# Patient Record
Sex: MALE | Race: WHITE | Employment: OTHER | URBAN - METROPOLITAN AREA
[De-identification: names, ages, dates, MRNs, and addresses within clinical notes are randomized per-mention and may not be internally consistent; named-entity substitution may affect disease eponyms.]

---

## 2021-11-23 ENCOUNTER — TRANSCRIBE ORDER (OUTPATIENT)
Dept: SCHEDULING | Age: 76
End: 2021-11-23

## 2021-11-23 DIAGNOSIS — R55 SYNCOPE: ICD-10-CM

## 2021-11-23 DIAGNOSIS — R42 DIZZINESS AND GIDDINESS: Primary | ICD-10-CM

## 2021-11-30 ENCOUNTER — HOSPITAL ENCOUNTER (OUTPATIENT)
Dept: CT IMAGING | Age: 76
Discharge: HOME | End: 2021-11-30
Attending: FAMILY MEDICINE
Payer: MEDICARE

## 2021-11-30 DIAGNOSIS — R55 SYNCOPE: ICD-10-CM

## 2021-11-30 DIAGNOSIS — R42 DIZZINESS AND GIDDINESS: ICD-10-CM

## 2021-11-30 PROCEDURE — 70450 CT HEAD/BRAIN W/O DYE: CPT

## 2023-07-20 ENCOUNTER — OFFICE VISIT (OUTPATIENT)
Age: 78
End: 2023-07-20
Payer: MEDICARE

## 2023-07-20 VITALS
DIASTOLIC BLOOD PRESSURE: 78 MMHG | RESPIRATION RATE: 16 BRPM | SYSTOLIC BLOOD PRESSURE: 122 MMHG | OXYGEN SATURATION: 97 % | HEIGHT: 72 IN | HEART RATE: 76 BPM | BODY MASS INDEX: 31.97 KG/M2 | WEIGHT: 236 LBS | TEMPERATURE: 97.8 F

## 2023-07-20 DIAGNOSIS — E53.8 B12 DEFICIENCY: ICD-10-CM

## 2023-07-20 DIAGNOSIS — E78.2 MIXED HYPERLIPIDEMIA: ICD-10-CM

## 2023-07-20 DIAGNOSIS — I65.23 BILATERAL CAROTID ARTERY STENOSIS: Primary | ICD-10-CM

## 2023-07-20 DIAGNOSIS — R41.3 DISTURBANCE OF MEMORY: ICD-10-CM

## 2023-07-20 DIAGNOSIS — I67.89 CEREBRAL MICROVASCULAR DISEASE: ICD-10-CM

## 2023-07-20 DIAGNOSIS — E55.9 VITAMIN D DEFICIENCY: ICD-10-CM

## 2023-07-20 DIAGNOSIS — E03.4 HYPOTHYROIDISM DUE TO ACQUIRED ATROPHY OF THYROID: ICD-10-CM

## 2023-07-20 DIAGNOSIS — I10 ESSENTIAL HYPERTENSION: ICD-10-CM

## 2023-07-20 PROBLEM — E78.5 HYPERLIPIDEMIA: Status: ACTIVE | Noted: 2023-07-20

## 2023-07-20 PROCEDURE — 99204 OFFICE O/P NEW MOD 45 MIN: CPT | Performed by: PSYCHIATRY & NEUROLOGY

## 2023-07-20 PROCEDURE — 3078F DIAST BP <80 MM HG: CPT | Performed by: PSYCHIATRY & NEUROLOGY

## 2023-07-20 PROCEDURE — 3074F SYST BP LT 130 MM HG: CPT | Performed by: PSYCHIATRY & NEUROLOGY

## 2023-07-20 PROCEDURE — 1123F ACP DISCUSS/DSCN MKR DOCD: CPT | Performed by: PSYCHIATRY & NEUROLOGY

## 2023-07-20 PROCEDURE — G8417 CALC BMI ABV UP PARAM F/U: HCPCS | Performed by: PSYCHIATRY & NEUROLOGY

## 2023-07-20 PROCEDURE — G8427 DOCREV CUR MEDS BY ELIG CLIN: HCPCS | Performed by: PSYCHIATRY & NEUROLOGY

## 2023-07-20 PROCEDURE — 1036F TOBACCO NON-USER: CPT | Performed by: PSYCHIATRY & NEUROLOGY

## 2023-07-20 RX ORDER — ASPIRIN 81 MG/1
TABLET, CHEWABLE ORAL
COMMUNITY

## 2023-07-20 RX ORDER — ATENOLOL 25 MG/1
TABLET ORAL
COMMUNITY
Start: 2023-05-24

## 2023-07-20 RX ORDER — ATORVASTATIN CALCIUM 80 MG/1
TABLET, FILM COATED ORAL
COMMUNITY
Start: 2023-05-14

## 2023-07-20 RX ORDER — ACETAMINOPHEN 160 MG
TABLET,DISINTEGRATING ORAL
COMMUNITY

## 2023-07-21 LAB
25(OH)D3 SERPL-MCNC: 42.4 NG/ML (ref 30–100)
FOLATE SERPL-MCNC: 57.5 NG/ML (ref 5–21)
TSH SERPL DL<=0.05 MIU/L-ACNC: 1.1 UIU/ML (ref 0.36–3.74)
VIT B12 SERPL-MCNC: 591 PG/ML (ref 193–986)

## 2023-07-21 NOTE — PROGRESS NOTES
Consult  REFERRED BY:  No primary care provider on file. CHIEF COMPLAINT: Memory loss      Subjective:     Joy Patel is a 65 y.o. right-handed  male we are seeing as a new patient, at the referral of Dr. Ron Barker, for urgent work in basis because of progressive memory loss over the last year that came on gradually and then slowly progressive, associated more with remembering recent things, where he puts things, names of people and what he is told, that is now becoming very worrisome to his wife. He had a brother who had Alzheimer's disease and just  of that. He had no other family history of dementia. He does not have any loss of bowel or bladder control and does not shuffle his gait. He has had no toxin exposure and significant head trauma in the past and denies any major depression or psychiatric problems. He has never had any evaluation for this. He eats fairly well and tries to stay fairly active and is a retired  and has a high school education. Again he had no unusual head trauma in the past.  He has had no complaints of headache, vision changes, weakness or sensory loss, or other focal neurologic deficits.     Past Medical History:   Diagnosis Date    CAD (coronary artery disease)     Dementia ([address])     Essential hypertension     Hx of CABG     Hyperlipidemia       Past Surgical History:   Procedure Laterality Date    LUMBAR LAMINECTOMY Bilateral     REVISION TOTAL HIP ARTHROPLASTY Right     REVISION TOTAL KNEE ARTHROPLASTY Left      Family History   Problem Relation Age of Onset    Heart Disease Father     Heart Disease Brother     Alzheimer's Disease Brother       Social History     Tobacco Use    Smoking status: Former     Types: Cigarettes    Smokeless tobacco: Never   Substance Use Topics    Alcohol use: Not Currently         Current Outpatient Medications:     aspirin 81 MG chewable tablet, , Disp: , Rfl:     atenolol (TENORMIN) 25 MG tablet, , Disp: , Rfl:

## 2023-07-24 ENCOUNTER — PROCEDURE VISIT (OUTPATIENT)
Age: 78
End: 2023-07-24
Payer: MEDICARE

## 2023-07-24 DIAGNOSIS — I67.89 CEREBRAL MICROVASCULAR DISEASE: ICD-10-CM

## 2023-07-24 DIAGNOSIS — I65.23 BILATERAL CAROTID ARTERY STENOSIS: ICD-10-CM

## 2023-07-24 PROCEDURE — 93880 EXTRACRANIAL BILAT STUDY: CPT | Performed by: PSYCHIATRY & NEUROLOGY

## 2023-08-16 ENCOUNTER — HOSPITAL ENCOUNTER (OUTPATIENT)
Facility: HOSPITAL | Age: 78
Discharge: HOME | End: 2023-08-19
Attending: PSYCHIATRY & NEUROLOGY
Payer: MEDICARE

## 2023-08-16 DIAGNOSIS — I67.89 CEREBRAL MICROVASCULAR DISEASE: ICD-10-CM

## 2023-08-16 DIAGNOSIS — R41.3 DISTURBANCE OF MEMORY: ICD-10-CM

## 2023-08-16 DIAGNOSIS — I65.23 BILATERAL CAROTID ARTERY STENOSIS: ICD-10-CM

## 2023-08-16 DIAGNOSIS — E53.8 B12 DEFICIENCY: ICD-10-CM

## 2023-08-16 PROCEDURE — 70553 MRI BRAIN STEM W/O & W/DYE: CPT

## 2023-08-16 PROCEDURE — A9579 GAD-BASE MR CONTRAST NOS,1ML: HCPCS | Performed by: PSYCHIATRY & NEUROLOGY

## 2023-08-16 PROCEDURE — 6360000004 HC RX CONTRAST MEDICATION: Performed by: PSYCHIATRY & NEUROLOGY

## 2023-08-16 RX ADMIN — GADOTERIDOL 20 ML: 279.3 INJECTION, SOLUTION INTRAVENOUS at 11:28

## 2023-08-19 NOTE — PROGRESS NOTES
I called the patient, talked to the wife, let her know the MRI scan showed atrophy.   In the temporal lobes, consistent with his memory loss syndrome, he will get his neuropsych testing she said thank you I told her she could look at the report on my chart

## 2023-10-23 ENCOUNTER — OFFICE VISIT (OUTPATIENT)
Age: 78
End: 2023-10-23
Payer: MEDICARE

## 2023-10-23 VITALS
WEIGHT: 233 LBS | RESPIRATION RATE: 16 BRPM | HEIGHT: 71 IN | BODY MASS INDEX: 32.62 KG/M2 | OXYGEN SATURATION: 98 % | TEMPERATURE: 99.2 F | DIASTOLIC BLOOD PRESSURE: 64 MMHG | SYSTOLIC BLOOD PRESSURE: 118 MMHG | HEART RATE: 63 BPM

## 2023-10-23 DIAGNOSIS — G30.1 LATE ONSET ALZHEIMER'S DISEASE WITHOUT BEHAVIORAL DISTURBANCE (HCC): ICD-10-CM

## 2023-10-23 DIAGNOSIS — I67.89 CEREBRAL MICROVASCULAR DISEASE: Primary | ICD-10-CM

## 2023-10-23 DIAGNOSIS — R41.3 DISTURBANCE OF MEMORY: ICD-10-CM

## 2023-10-23 DIAGNOSIS — F02.80 LATE ONSET ALZHEIMER'S DISEASE WITHOUT BEHAVIORAL DISTURBANCE (HCC): ICD-10-CM

## 2023-10-23 DIAGNOSIS — I65.23 BILATERAL CAROTID ARTERY STENOSIS: ICD-10-CM

## 2023-10-23 PROCEDURE — 1123F ACP DISCUSS/DSCN MKR DOCD: CPT | Performed by: PSYCHIATRY & NEUROLOGY

## 2023-10-23 PROCEDURE — 3074F SYST BP LT 130 MM HG: CPT | Performed by: PSYCHIATRY & NEUROLOGY

## 2023-10-23 PROCEDURE — G8427 DOCREV CUR MEDS BY ELIG CLIN: HCPCS | Performed by: PSYCHIATRY & NEUROLOGY

## 2023-10-23 PROCEDURE — G8417 CALC BMI ABV UP PARAM F/U: HCPCS | Performed by: PSYCHIATRY & NEUROLOGY

## 2023-10-23 PROCEDURE — 99214 OFFICE O/P EST MOD 30 MIN: CPT | Performed by: PSYCHIATRY & NEUROLOGY

## 2023-10-23 PROCEDURE — G8484 FLU IMMUNIZE NO ADMIN: HCPCS | Performed by: PSYCHIATRY & NEUROLOGY

## 2023-10-23 PROCEDURE — 3078F DIAST BP <80 MM HG: CPT | Performed by: PSYCHIATRY & NEUROLOGY

## 2023-10-23 PROCEDURE — 1036F TOBACCO NON-USER: CPT | Performed by: PSYCHIATRY & NEUROLOGY

## 2023-10-23 RX ORDER — GLUCOSAM/CHONDRO/HERB 149/HYAL 750-100 MG
1 TABLET ORAL DAILY
Qty: 30 TABLET | Refills: 3
Start: 2023-10-23

## 2023-10-23 RX ORDER — DONEPEZIL HYDROCHLORIDE 10 MG/1
TABLET, FILM COATED ORAL
Qty: 30 TABLET | Refills: 11 | Status: SHIPPED | OUTPATIENT
Start: 2023-10-23 | End: 2023-10-27 | Stop reason: SDUPTHER

## 2023-10-23 RX ORDER — ZINC GLUCONATE 50 MG
TABLET ORAL
COMMUNITY
Start: 2022-02-01

## 2023-10-23 ASSESSMENT — PATIENT HEALTH QUESTIONNAIRE - PHQ9
SUM OF ALL RESPONSES TO PHQ QUESTIONS 1-9: 0
1. LITTLE INTEREST OR PLEASURE IN DOING THINGS: 0
2. FEELING DOWN, DEPRESSED OR HOPELESS: 0
SUM OF ALL RESPONSES TO PHQ9 QUESTIONS 1 & 2: 0
SUM OF ALL RESPONSES TO PHQ QUESTIONS 1-9: 0

## 2023-10-24 NOTE — PROGRESS NOTES
memory test showed he had mild dementia or MCI he may be a candidate for that medication, and possibly for a amyloid scan of the brain to see if his dementia really is more amyloid and Alzheimer's type and not some other dementia like frontotemporal dementia or multisystem atrophy etc.  He is on 5 mg of Aricept also and this may be helping him to, we will advance that to 10 mg and the risk and benefits all explained the patient and his wife today, we counseled him on the other medication Namenda and the new monoclonal antibody against amyloid that is available now and the one that may be available next year that may even be better. We explained the risk of medication and its 10% incidence of inflammatory lesions in the brain, and 1% incidence of significant hemorrhagic lesions in the brain, and 3 deaths so far from the monoclonal antibodies. They are not too anxious for the medications now because they realize this expensive, it is time-consuming, has to be given every 2 weeks, requires multiple testing. He has not seemed to deteriorate over the last 6 months anyway. We encouraged him to stay mentally and physically active try to exercise mildly every day, eat a Mediterranean type diet, he said he is try to do all that perhaps is why he is doing better. He is also taking a multivitamin with vitamin D every day if he recommended also. His carotid Doppler studies last visit were also negative for any significant stenosis or surgical lesions. 36 minutes spent with the wife and the patient going over all this in detail.   Signed By: Jayla Rubio MD     October 23, 2023       CC: Mounika Su MD  FAX: [phone]

## 2023-10-27 ENCOUNTER — CLINICAL DOCUMENTATION (OUTPATIENT)
Age: 78
End: 2023-10-27

## 2023-10-27 DIAGNOSIS — F02.80 LATE ONSET ALZHEIMER'S DISEASE WITHOUT BEHAVIORAL DISTURBANCE (HCC): ICD-10-CM

## 2023-10-27 DIAGNOSIS — I67.89 CEREBRAL MICROVASCULAR DISEASE: ICD-10-CM

## 2023-10-27 DIAGNOSIS — R41.3 DISTURBANCE OF MEMORY: ICD-10-CM

## 2023-10-27 DIAGNOSIS — G30.1 LATE ONSET ALZHEIMER'S DISEASE WITHOUT BEHAVIORAL DISTURBANCE (HCC): ICD-10-CM

## 2023-10-27 DIAGNOSIS — I65.23 BILATERAL CAROTID ARTERY STENOSIS: ICD-10-CM

## 2023-10-27 RX ORDER — DONEPEZIL HYDROCHLORIDE 10 MG/1
TABLET, FILM COATED ORAL
Qty: 30 TABLET | Refills: 11 | Status: SHIPPED | OUTPATIENT
Start: 2023-10-27

## 2023-10-31 ENCOUNTER — OFFICE VISIT (OUTPATIENT)
Age: 78
End: 2023-10-31
Payer: MEDICARE

## 2023-10-31 DIAGNOSIS — F02.A0 MILD LATE ONSET ALZHEIMER'S DEMENTIA WITHOUT BEHAVIORAL DISTURBANCE, PSYCHOTIC DISTURBANCE, MOOD DISTURBANCE, OR ANXIETY (HCC): Primary | ICD-10-CM

## 2023-10-31 DIAGNOSIS — G30.1 MILD LATE ONSET ALZHEIMER'S DEMENTIA WITHOUT BEHAVIORAL DISTURBANCE, PSYCHOTIC DISTURBANCE, MOOD DISTURBANCE, OR ANXIETY (HCC): Primary | ICD-10-CM

## 2023-10-31 PROCEDURE — 1036F TOBACCO NON-USER: CPT | Performed by: CLINICAL NEUROPSYCHOLOGIST

## 2023-10-31 PROCEDURE — 90785 PSYTX COMPLEX INTERACTIVE: CPT | Performed by: CLINICAL NEUROPSYCHOLOGIST

## 2023-10-31 PROCEDURE — 90791 PSYCH DIAGNOSTIC EVALUATION: CPT | Performed by: CLINICAL NEUROPSYCHOLOGIST

## 2023-10-31 PROCEDURE — 1123F ACP DISCUSS/DSCN MKR DOCD: CPT | Performed by: CLINICAL NEUROPSYCHOLOGIST

## 2023-10-31 NOTE — PROGRESS NOTES
follow-up with referring provider for ongoing management    TIME DOCUMENTATION:  Clinical Interview: 8821 - 8943 = 20 minutes    Visit was complicated by Patient expressed desire to have others present during visit. Provider spent 10 minutes providing interactive complexity services due to need to obtain or communicate information to those involved in patient's care. The session included the use of the following adaptations in order to overcome the difficulties. Others were included in order to obtain collateral history on individual with reported short-term episodic memory deficits and likely dementia .       [id number]  [id number]

## 2023-11-06 ENCOUNTER — TELEPHONE (OUTPATIENT)
Age: 78
End: 2023-11-06

## 2023-11-06 NOTE — TELEPHONE ENCOUNTER
Contacted [employer] no PA is needed for [id number], [id number], [id number], [id number], [id number].     Transaction ID: [id number]

## 2023-11-16 ENCOUNTER — PROCEDURE VISIT (OUTPATIENT)
Age: 78
End: 2023-11-16
Payer: MEDICARE

## 2023-11-16 DIAGNOSIS — F03.A0 MILD DEMENTIA WITHOUT BEHAVIORAL DISTURBANCE, PSYCHOTIC DISTURBANCE, MOOD DISTURBANCE, OR ANXIETY, UNSPECIFIED DEMENTIA TYPE (HCC): Primary | ICD-10-CM

## 2023-11-16 PROCEDURE — 96136 PSYCL/NRPSYC TST PHY/QHP 1ST: CPT | Performed by: CLINICAL NEUROPSYCHOLOGIST

## 2023-11-16 PROCEDURE — 96138 PSYCL/NRPSYC TECH 1ST: CPT | Performed by: CLINICAL NEUROPSYCHOLOGIST

## 2023-11-16 PROCEDURE — 96139 PSYCL/NRPSYC TST TECH EA: CPT | Performed by: CLINICAL NEUROPSYCHOLOGIST

## 2023-12-01 ENCOUNTER — CLINICAL DOCUMENTATION (OUTPATIENT)
Age: 78
End: 2023-12-01

## 2023-12-01 PROBLEM — F03.A0 MILD DEMENTIA WITHOUT BEHAVIORAL DISTURBANCE, PSYCHOTIC DISTURBANCE, MOOD DISTURBANCE, OR ANXIETY (HCC): Status: ACTIVE | Noted: 2023-12-01

## 2023-12-01 NOTE — PROGRESS NOTES
average  Semantic Fluency: Average  Learning   List learning: Low average  Story learning: Low average  Basic figure learning: Exceptionally low  Progressively complex figure learning: Below average  Memory:  List recall: Below average  Story recall: Below average  Basic figure recall: Exceptionally low  Progressively complex figure recall: Exceptionally low  Recognition:  List recognition: Low average  Story recognition: Within normal limits  Basic figure recognition: Within normal limits  Progressively complex figure recognition: Low average  Functional:   Pillbox organization: Failed  Bill pay: Exceptionally low  Practical judgment: Low average  Psychiatric/Sleep:   Depression: Within normal limits  Anxiety: Within normal limits  Daytime sleepiness: Within normal limits  Sleep quality: Within normal limits    TIME:  Clinical Interview: 0059 - 4882 = 20 minutes  Testing and scoring by provider: 16 minutes  Testing by technician: 6872 - 1018 = 135 minutes  Scoring by technician: 23 minutes    BILLING:  76955 x 1 Unit  96136 x 1 Unit  96138 x 1 Unit  96139 x 4 Units

## 2023-12-01 NOTE — PROGRESS NOTES
Patient's neuropsych testing suggest a mild dementia but more typical of a subcortical dementia rather than Alzheimer's type, so he may have more of a frontal temporal dementia or vascular dementia we will need to evaluate that at his return.

## 2023-12-04 ENCOUNTER — OFFICE VISIT (OUTPATIENT)
Age: 78
End: 2023-12-04
Payer: MEDICARE

## 2023-12-04 DIAGNOSIS — F03.A0 MILD DEMENTIA WITHOUT BEHAVIORAL DISTURBANCE, PSYCHOTIC DISTURBANCE, MOOD DISTURBANCE, OR ANXIETY, UNSPECIFIED DEMENTIA TYPE (HCC): Primary | ICD-10-CM

## 2023-12-04 PROCEDURE — 96133 NRPSYC TST EVAL PHYS/QHP EA: CPT | Performed by: CLINICAL NEUROPSYCHOLOGIST

## 2023-12-04 PROCEDURE — 96132 NRPSYC TST EVAL PHYS/QHP 1ST: CPT | Performed by: CLINICAL NEUROPSYCHOLOGIST

## 2023-12-04 NOTE — PROGRESS NOTES
test results and clinical data, clinical decision-making, treatment planning and report, and interactive feedback to the patient, family member(s) or caregiver(s), when performed.

## 2024-01-25 DIAGNOSIS — I65.23 BILATERAL CAROTID ARTERY STENOSIS: ICD-10-CM

## 2024-01-25 DIAGNOSIS — F02.80 LATE ONSET ALZHEIMER'S DISEASE WITHOUT BEHAVIORAL DISTURBANCE (HCC): ICD-10-CM

## 2024-01-25 DIAGNOSIS — G30.1 LATE ONSET ALZHEIMER'S DISEASE WITHOUT BEHAVIORAL DISTURBANCE (HCC): ICD-10-CM

## 2024-01-25 DIAGNOSIS — R41.3 DISTURBANCE OF MEMORY: ICD-10-CM

## 2024-01-25 DIAGNOSIS — I67.89 CEREBRAL MICROVASCULAR DISEASE: ICD-10-CM

## 2024-01-25 RX ORDER — DONEPEZIL HYDROCHLORIDE 10 MG/1
TABLET, FILM COATED ORAL
Qty: 30 TABLET | Refills: 11 | Status: SHIPPED | OUTPATIENT
Start: 2024-01-25

## 2024-01-25 NOTE — TELEPHONE ENCOUNTER
Received fax from [facility] pharmacy asking to send pt's rx for donepezil.     Last office visit 12/04/2023  Next office visit 04/23/2024  Last med refill 10/27/2023

## 2024-04-23 ENCOUNTER — OFFICE VISIT (OUTPATIENT)
Age: 79
End: 2024-04-23
Payer: MEDICARE

## 2024-04-23 VITALS
HEART RATE: 61 BPM | BODY MASS INDEX: 33.1 KG/M2 | DIASTOLIC BLOOD PRESSURE: 74 MMHG | OXYGEN SATURATION: 96 % | RESPIRATION RATE: 17 BRPM | SYSTOLIC BLOOD PRESSURE: 136 MMHG | WEIGHT: 236.4 LBS | HEIGHT: 71 IN | TEMPERATURE: 97.7 F

## 2024-04-23 DIAGNOSIS — F02.80 FRONTOTEMPORAL DEMENTIA (HCC): ICD-10-CM

## 2024-04-23 DIAGNOSIS — G31.09 FRONTOTEMPORAL DEMENTIA (HCC): ICD-10-CM

## 2024-04-23 DIAGNOSIS — M96.1 LUMBAR POST-LAMINECTOMY SYNDROME: ICD-10-CM

## 2024-04-23 DIAGNOSIS — I65.23 BILATERAL CAROTID ARTERY STENOSIS: Primary | ICD-10-CM

## 2024-04-23 DIAGNOSIS — I67.89 CEREBRAL MICROVASCULAR DISEASE: ICD-10-CM

## 2024-04-23 DIAGNOSIS — R41.3 DISTURBANCE OF MEMORY: ICD-10-CM

## 2024-04-23 DIAGNOSIS — F01.50 VASCULAR DEMENTIA WITHOUT BEHAVIORAL DISTURBANCE, PSYCHOTIC DISTURBANCE, MOOD DISTURBANCE, OR ANXIETY, UNSPECIFIED DEMENTIA SEVERITY (HCC): ICD-10-CM

## 2024-04-23 PROCEDURE — 3078F DIAST BP <80 MM HG: CPT | Performed by: PSYCHIATRY & NEUROLOGY

## 2024-04-23 PROCEDURE — G8427 DOCREV CUR MEDS BY ELIG CLIN: HCPCS | Performed by: PSYCHIATRY & NEUROLOGY

## 2024-04-23 PROCEDURE — 99214 OFFICE O/P EST MOD 30 MIN: CPT | Performed by: PSYCHIATRY & NEUROLOGY

## 2024-04-23 PROCEDURE — 1123F ACP DISCUSS/DSCN MKR DOCD: CPT | Performed by: PSYCHIATRY & NEUROLOGY

## 2024-04-23 PROCEDURE — 3075F SYST BP GE 130 - 139MM HG: CPT | Performed by: PSYCHIATRY & NEUROLOGY

## 2024-04-23 PROCEDURE — 1036F TOBACCO NON-USER: CPT | Performed by: PSYCHIATRY & NEUROLOGY

## 2024-04-23 PROCEDURE — G8417 CALC BMI ABV UP PARAM F/U: HCPCS | Performed by: PSYCHIATRY & NEUROLOGY

## 2024-04-23 RX ORDER — MEMANTINE HYDROCHLORIDE 5 MG/1
5 TABLET ORAL 2 TIMES DAILY
Qty: 180 TABLET | Refills: 3 | Status: SHIPPED | OUTPATIENT
Start: 2024-04-23

## 2024-04-23 ASSESSMENT — PATIENT HEALTH QUESTIONNAIRE - PHQ9
SUM OF ALL RESPONSES TO PHQ9 QUESTIONS 1 & 2: 0
1. LITTLE INTEREST OR PLEASURE IN DOING THINGS: NOT AT ALL
SUM OF ALL RESPONSES TO PHQ QUESTIONS 1-9: 0
2. FEELING DOWN, DEPRESSED OR HOPELESS: NOT AT ALL
SUM OF ALL RESPONSES TO PHQ QUESTIONS 1-9: 0

## 2024-04-23 NOTE — PROGRESS NOTES
Consult  REFERRED BY:  Jarrod Wood MD    CHIEF COMPLAINT: Patient feels his memory loss is not progressing on Aricept 10 mg a day, and to go over his neuropsych testing.      Subjective:     Jarrod Juarez is a 78 y.o.right-handed  male we are seeing at the referral of Dr. Wood, for urgent work in basis because of need to go over patient's neuropsych testing that he had done in December, they suggest that he does have a mild dementia, but thought to be more typical of frontotemporal dementia with subcortical dementia, not typical of Alzheimer's type dementia, and for that reason he is not really at candidate for the new monoclonal antibodies against amyloid and the patient and his wife both feel that they do not want that medication because of the possibility of the swelling and hemorrhage and they are very adamant they do not want it.  He is willing to try Namenda which we added on next, he is taking 10 mg of Aricept and will add on 5 mg twice a day of Namenda to help slow his memory down.  His MRI scan showed prominent atrophy and frontal temporal atrophy possibly consistent with frontotemporal dementia.  His carotid Doppler study showed no significant carotid stenosis.  Previously patient seen for progressive memory loss over the last year that came on gradually and then slowly progressed,, associated more with remembering recent things, where he puts things, names of people and what he is told, that is now becoming very worrisome to his wife.  He has had several neuropsych testing done, showing mild cognitive impairment and then normal, in the past, at [facility], and by Dr. Gutierrez, and his memory test is as above.  He was able to remember 2 of 3 words, do serial sevens, spell world backward and draw a clock showing the time 10:50 correctly.  He had a brother who had Alzheimer's disease and just  of that.  He had no other family history of dementia.  He does not have any loss of bowel or bladder control

## 2024-09-04 ENCOUNTER — OFFICE VISIT (OUTPATIENT)
Age: 79
End: 2024-09-04

## 2024-09-04 DIAGNOSIS — G30.1 MILD LATE ONSET ALZHEIMER'S DEMENTIA WITHOUT BEHAVIORAL DISTURBANCE, PSYCHOTIC DISTURBANCE, MOOD DISTURBANCE, OR ANXIETY (HCC): Primary | ICD-10-CM

## 2024-09-04 DIAGNOSIS — F02.A0 MILD LATE ONSET ALZHEIMER'S DEMENTIA WITHOUT BEHAVIORAL DISTURBANCE, PSYCHOTIC DISTURBANCE, MOOD DISTURBANCE, OR ANXIETY (HCC): Primary | ICD-10-CM

## 2024-09-04 NOTE — PROGRESS NOTES
Intake Note      Patient Name: Jarrod Juarez  YOB: 1945    Age: 78 y.o.  Date of Intake: 9/4/2024   Education: 12 Ethnicity White   Gender: Male Referring Provider: Dr. Graham     REASON FOR REFERRAL AND EVALUATION PROCEDURES:  Jarrod Juarez  was referred for evaluation by his Neurologist to assist in differential diagnosis and individualized treatment planning. he understood the rationale and procedures for evaluation, as well as the limits to confidentiality, and agreed to participate. he consented to have this report made available to his  treating providers through his  electronic medical records.   History Sources: Patient, Spouse, and Medical Record    HISTORY OF PRESENT ILLNESS:  The patient is a 78-year-old male with pertinent medical history noted for disturbance of memory, cerebral microvascular disease, B12 deficiency, hypothyroidism, vitamin D deficiency, hyperlipidemia, essential hypertension, late onset Alzheimer's disease without behavioral disturbance, mild dementia without behavioral disturbance, vascular dementia without behavioral disturbance, and frontotemporal dementia.  He presented for serial neuropsychological evaluation following workup in our office in October/November of last year.  From my previous report:    The patient presented for clinical interview accompanied by his wife.  While he displayed fair insight and provided details related to his history, his wife assisted with providing additional pertinent information.  According to the patient and his wife, for a little over the past year, the patient has experienced insidious onset of gradually progressive declines in his cognitive functioning.  They described the cognitive changes to include temporally-graded short-term episodic memory loss such as forgetting recent events/conversations and repeating himself.  They largely denied concerns relating to other domains of cognitive functioning     Pertaining to the patient's

## 2024-11-11 ENCOUNTER — PROCEDURE VISIT (OUTPATIENT)
Age: 79
End: 2024-11-11
Payer: MEDICARE

## 2024-11-11 DIAGNOSIS — F03.90 MAJOR NEUROCOGNITIVE DISORDER (HCC): Primary | ICD-10-CM

## 2024-11-11 PROCEDURE — 96139 PSYCL/NRPSYC TST TECH EA: CPT | Performed by: CLINICAL NEUROPSYCHOLOGIST

## 2024-11-11 PROCEDURE — 96138 PSYCL/NRPSYC TECH 1ST: CPT | Performed by: CLINICAL NEUROPSYCHOLOGIST

## 2024-11-11 PROCEDURE — 96133 NRPSYC TST EVAL PHYS/QHP EA: CPT | Performed by: CLINICAL NEUROPSYCHOLOGIST

## 2024-11-11 PROCEDURE — 96132 NRPSYC TST EVAL PHYS/QHP 1ST: CPT | Performed by: CLINICAL NEUROPSYCHOLOGIST

## 2024-11-19 ENCOUNTER — HOSPITAL ENCOUNTER (OUTPATIENT)
Facility: HOSPITAL | Age: 79
Discharge: HOME | End: 2024-11-22
Payer: MEDICARE

## 2024-11-19 ENCOUNTER — TRANSCRIBE ORDERS (OUTPATIENT)
Facility: HOSPITAL | Age: 79
End: 2024-11-19

## 2024-11-19 DIAGNOSIS — G89.29 CHRONIC LOW BACK PAIN, UNSPECIFIED BACK PAIN LATERALITY, UNSPECIFIED WHETHER SCIATICA PRESENT: Primary | ICD-10-CM

## 2024-11-19 DIAGNOSIS — M54.50 CHRONIC LOW BACK PAIN, UNSPECIFIED BACK PAIN LATERALITY, UNSPECIFIED WHETHER SCIATICA PRESENT: ICD-10-CM

## 2024-11-19 DIAGNOSIS — G89.29 CHRONIC LOW BACK PAIN, UNSPECIFIED BACK PAIN LATERALITY, UNSPECIFIED WHETHER SCIATICA PRESENT: ICD-10-CM

## 2024-11-19 DIAGNOSIS — M54.50 CHRONIC LOW BACK PAIN, UNSPECIFIED BACK PAIN LATERALITY, UNSPECIFIED WHETHER SCIATICA PRESENT: Primary | ICD-10-CM

## 2024-11-19 PROCEDURE — 72110 X-RAY EXAM L-2 SPINE 4/>VWS: CPT

## 2024-11-26 ENCOUNTER — TELEPHONE (OUTPATIENT)
Age: 79
End: 2024-11-26

## 2024-11-26 NOTE — PROGRESS NOTES
Neuropsychological Evaluation Report      Patient Name: Jarrod Juarez  YOB: 1945    Age: 78 y.o.  Date of Intake: 2024   Education: 12 Date of Testin2024   Gender: Male Ethnicity: White     Referring Provider: Dr. Graham     REASON FOR REFERRAL AND EVALUATION PROCEDURES:  Jarrod Juarez  was referred for neuropsychological evaluation by his Neurologist to obtain a quantitative assessment of his current level of neurocognitive functioning, assist in differential diagnosis, and aid in individualized treatment planning. The patient understood the rationale and procedures for evaluation, as well as the limits to confidentiality, and they agreed to participate. The patient consented to have this report made available to his  treating providers through his  electronic medical records. This evaluation was completed with the patient by Ger Anaya PsyD with the exception of testing by technician, which was completed by NORM Bowman under the supervision of Dr. Anaya.  History Sources: Patient, Spouse, Medical Record, and Test Data    SUMMARY AND IMPRESSION:  The following section is a summary of the patient's pertinent test results and impressions. A more thorough review of the patient's background and test scores can be found below.    The patient's neuropsychological evaluation was notable for moderate to profound impairment (2-3 standard deviations below the mean) on measures of executive functioning and visuospatial perception/construction.  Learning was generally in the range of mild impairment (~1.5 standard deviations below the mean) while recall was in the range of moderate to profound impairment.  Recognition/discrimination for verbal list information was compromised but other recognition/discrimination performances were within normal limits.  Mental processing speed was moderately impaired.  Working memory was variable but was impacted by executive dysfunction (i.e.,

## 2024-11-27 NOTE — TELEPHONE ENCOUNTER
Returned call to the patient's wife. Informed that the provider is unavailable this evening and unable to see the patient at 4 PM. She states they are unable to come in today at 9:30 AM and asked that the appt be rescheduled to a different date. Appt rescheduled.

## 2024-11-27 NOTE — TELEPHONE ENCOUNTER
Patient wife, Nickie called to say they wanted to keep their appt today at 4PM. They are unable to do 9:30AM today.     During the conversation, she walked away from the phone with no warning, or asking to hold on. I said, \"hello?\" twice with no response, so I hung up the phone. Thank you.

## 2024-12-04 ENCOUNTER — OFFICE VISIT (OUTPATIENT)
Age: 79
End: 2024-12-04
Payer: MEDICARE

## 2024-12-04 ENCOUNTER — CLINICAL DOCUMENTATION (OUTPATIENT)
Age: 79
End: 2024-12-04

## 2024-12-04 DIAGNOSIS — F03.90 MAJOR NEUROCOGNITIVE DISORDER (HCC): Primary | ICD-10-CM

## 2024-12-04 PROCEDURE — 96132 NRPSYC TST EVAL PHYS/QHP 1ST: CPT | Performed by: CLINICAL NEUROPSYCHOLOGIST

## 2024-12-04 NOTE — PROGRESS NOTES
Chief complaint: Patient presented for review of previously completed neuropsychological evaluation and discussion of impressions/recommendations.    Prior to seeing the patient for today's visit, I reviewed pertinent records, including the previously completed report, the records in Epic, and any updated visits from other providers since the patient's last visit.    I provided feedback services related to the previously completed report. Attendees included: Patient and Spouse. Education was provided regarding my diagnostic impressions, and we discussed treatment plan/options. Attendees were provided with the opportunity to ask questions, which were answered to the best of my ability.    We discussed, in detail, the following:  Reviewed findings from evaluation including test results, diagnosis, and suspected contributing factors  Discussed recommendations outlined in report  Answered questions to the best of my ability    The patient needs to:   Follow up with referring provider for ongoing management, Complete driving evaluation, Use practical strategies to compensate for cognitive weaknesses (See handout), Emphasize modifiable risk and protective factors for cognitive functioning (e.g., exercise, diet, cognitive stimulation; see handout), and Access community resources we discussed for additional support (See handout)    The patient had the following reactions to recommendations: Patient and wife reported understanding results and recommendations.  Wife stated the patient had difficulty during their road trip to [place].  Driving was more challenging for him.  She also reported he seems more confused since they returned.    ASSESSMENT:  Major neurocognitive disorder due to underlying disease    TIME SPENT PROVIDING SERVICES:   31 minutes     BILLING:  96132 x 1 Units    *Code 21776 Neuropsychological testing evaluation services include: Integration of patient data, interpretation of standardized test results

## 2024-12-04 NOTE — PROGRESS NOTES
Patient's neuropsych testing suggest moderate impairment and dementia possibly the frontal temporal type and may be of Parkinson's disease because of some tremor, and neurologic follow-up recommended but he does not appear to be a candidate for monoclonal antibodies against amyloid because of his moderate impairment and atypical dementia for Alzheimer's

## 2024-12-17 ENCOUNTER — OFFICE VISIT (OUTPATIENT)
Age: 79
End: 2024-12-17
Payer: MEDICARE

## 2024-12-17 VITALS
OXYGEN SATURATION: 97 % | DIASTOLIC BLOOD PRESSURE: 70 MMHG | RESPIRATION RATE: 19 BRPM | SYSTOLIC BLOOD PRESSURE: 132 MMHG | BODY MASS INDEX: 25.09 KG/M2 | TEMPERATURE: 98.1 F | HEART RATE: 62 BPM | WEIGHT: 179.2 LBS | HEIGHT: 71 IN

## 2024-12-17 DIAGNOSIS — G20.A2 PARKINSON'S DISEASE WITHOUT DYSKINESIA, WITH FLUCTUATING MANIFESTATIONS (HCC): Primary | ICD-10-CM

## 2024-12-17 DIAGNOSIS — I65.23 BILATERAL CAROTID ARTERY STENOSIS: ICD-10-CM

## 2024-12-17 DIAGNOSIS — I67.89 CEREBRAL MICROVASCULAR DISEASE: ICD-10-CM

## 2024-12-17 DIAGNOSIS — F02.80 LATE ONSET ALZHEIMER'S DISEASE WITHOUT BEHAVIORAL DISTURBANCE (HCC): ICD-10-CM

## 2024-12-17 DIAGNOSIS — G30.1 LATE ONSET ALZHEIMER'S DISEASE WITHOUT BEHAVIORAL DISTURBANCE (HCC): ICD-10-CM

## 2024-12-17 DIAGNOSIS — R41.3 DISTURBANCE OF MEMORY: ICD-10-CM

## 2024-12-17 DIAGNOSIS — G25.0 BENIGN ESSENTIAL TREMOR SYNDROME: ICD-10-CM

## 2024-12-17 PROCEDURE — 99214 OFFICE O/P EST MOD 30 MIN: CPT | Performed by: PSYCHIATRY & NEUROLOGY

## 2024-12-17 PROCEDURE — G8427 DOCREV CUR MEDS BY ELIG CLIN: HCPCS | Performed by: PSYCHIATRY & NEUROLOGY

## 2024-12-17 PROCEDURE — 1159F MED LIST DOCD IN RCRD: CPT | Performed by: PSYCHIATRY & NEUROLOGY

## 2024-12-17 PROCEDURE — 3078F DIAST BP <80 MM HG: CPT | Performed by: PSYCHIATRY & NEUROLOGY

## 2024-12-17 PROCEDURE — 1036F TOBACCO NON-USER: CPT | Performed by: PSYCHIATRY & NEUROLOGY

## 2024-12-17 PROCEDURE — G8484 FLU IMMUNIZE NO ADMIN: HCPCS | Performed by: PSYCHIATRY & NEUROLOGY

## 2024-12-17 PROCEDURE — G8420 CALC BMI NORM PARAMETERS: HCPCS | Performed by: PSYCHIATRY & NEUROLOGY

## 2024-12-17 PROCEDURE — 1123F ACP DISCUSS/DSCN MKR DOCD: CPT | Performed by: PSYCHIATRY & NEUROLOGY

## 2024-12-17 PROCEDURE — 3075F SYST BP GE 130 - 139MM HG: CPT | Performed by: PSYCHIATRY & NEUROLOGY

## 2024-12-17 PROCEDURE — 1160F RVW MEDS BY RX/DR IN RCRD: CPT | Performed by: PSYCHIATRY & NEUROLOGY

## 2024-12-17 PROCEDURE — 1125F AMNT PAIN NOTED PAIN PRSNT: CPT | Performed by: PSYCHIATRY & NEUROLOGY

## 2024-12-17 RX ORDER — DONEPEZIL HYDROCHLORIDE 10 MG/1
10 TABLET, FILM COATED ORAL
Qty: 90 TABLET | Refills: 4 | Status: SHIPPED | OUTPATIENT
Start: 2024-12-17

## 2024-12-17 RX ORDER — MEMANTINE HYDROCHLORIDE 10 MG/1
10 TABLET ORAL 2 TIMES DAILY
Qty: 180 TABLET | Refills: 5 | Status: SHIPPED | OUTPATIENT
Start: 2024-12-17

## 2024-12-17 RX ORDER — CARBIDOPA AND LEVODOPA 25; 100 MG/1; MG/1
1 TABLET ORAL 3 TIMES DAILY
Qty: 90 TABLET | Refills: 11 | Status: SHIPPED | OUTPATIENT
Start: 2024-12-17

## 2024-12-17 ASSESSMENT — PATIENT HEALTH QUESTIONNAIRE - PHQ9
SUM OF ALL RESPONSES TO PHQ QUESTIONS 1-9: 0
1. LITTLE INTEREST OR PLEASURE IN DOING THINGS: NOT AT ALL
SUM OF ALL RESPONSES TO PHQ QUESTIONS 1-9: 0
SUM OF ALL RESPONSES TO PHQ9 QUESTIONS 1 & 2: 0
2. FEELING DOWN, DEPRESSED OR HOPELESS: NOT AT ALL

## 2024-12-18 NOTE — PROGRESS NOTES
Consult  REFERRED BY:  Jarrod Wood MD    CHIEF COMPLAINT: Patient seen for new problem of tremors and to go over recent neuropsych testing which suggest a moderate dementia, probably more the frontal temporal type and to examine the patient for possible Parkinson's disease because of increasing tremors in his hands for the last several months.  Patient feels his memory loss is not progressing on Aricept 10 mg a day, and Namenda 5 mg twice a day and wants to go over his neuropsych testing.      Subjective:     Jarrod Juarez is a 79 y.o.right-handed  male we are seeing at the referral of Dr. Wood, for urgent work in basis because of need to go over patient's neuropsych testing that he had done in December 2024 and, Patient seen for new problem of tremors and to go over recent neuropsych testing which suggest a moderate dementia, probably more the frontal temporal type and to examine the patient for possible Parkinson's disease because of increasing tremors in his hands for the last several months.  Patient feels his memory loss is not progressing on Aricept 10 mg a day, and Namenda 5 mg twice a day and wants to go over his neuropsych testing.  The neuropsych testing did suggest a moderate dementia, more the frontal temporal type and in view of his increasing tremor that he says is more of a static and intention type the diagnosis of possible Parkinson's disease was raised, and the patient does appear to have some cogwheeling and rigidity and maybe even rest tremor in his hands, right side slightly worse than the left and slight decreased arm swing on the right side.  It does appear he does have Parkinson's disease which is another primary degenerative progressive brain disease, and for that we will try to increase his Namenda because Namenda helps the tremor and may help the Parkinson's disease and also helps his memory which she said has been stable on his current medications, but he will be advanced

## 2025-03-17 ENCOUNTER — OFFICE VISIT (OUTPATIENT)
Age: 80
End: 2025-03-17
Payer: MEDICARE

## 2025-03-17 VITALS
WEIGHT: 234 LBS | HEIGHT: 72 IN | BODY MASS INDEX: 31.69 KG/M2 | HEART RATE: 62 BPM | DIASTOLIC BLOOD PRESSURE: 68 MMHG | TEMPERATURE: 97.8 F | OXYGEN SATURATION: 98 % | SYSTOLIC BLOOD PRESSURE: 124 MMHG | RESPIRATION RATE: 18 BRPM

## 2025-03-17 DIAGNOSIS — G30.1 LATE ONSET ALZHEIMER'S DISEASE WITHOUT BEHAVIORAL DISTURBANCE (HCC): Primary | ICD-10-CM

## 2025-03-17 DIAGNOSIS — F02.80 LATE ONSET ALZHEIMER'S DISEASE WITHOUT BEHAVIORAL DISTURBANCE (HCC): Primary | ICD-10-CM

## 2025-03-17 DIAGNOSIS — G20.A2 PARKINSON'S DISEASE WITHOUT DYSKINESIA, WITH FLUCTUATING MANIFESTATIONS (HCC): ICD-10-CM

## 2025-03-17 PROCEDURE — 3074F SYST BP LT 130 MM HG: CPT

## 2025-03-17 PROCEDURE — 1036F TOBACCO NON-USER: CPT

## 2025-03-17 PROCEDURE — 3078F DIAST BP <80 MM HG: CPT

## 2025-03-17 PROCEDURE — 1159F MED LIST DOCD IN RCRD: CPT

## 2025-03-17 PROCEDURE — G8417 CALC BMI ABV UP PARAM F/U: HCPCS

## 2025-03-17 PROCEDURE — 99215 OFFICE O/P EST HI 40 MIN: CPT

## 2025-03-17 PROCEDURE — G8427 DOCREV CUR MEDS BY ELIG CLIN: HCPCS

## 2025-03-17 PROCEDURE — 1123F ACP DISCUSS/DSCN MKR DOCD: CPT

## 2025-03-17 PROCEDURE — 1126F AMNT PAIN NOTED NONE PRSNT: CPT

## 2025-03-17 PROCEDURE — 1160F RVW MEDS BY RX/DR IN RCRD: CPT

## 2025-03-17 ASSESSMENT — ENCOUNTER SYMPTOMS
GASTROINTESTINAL NEGATIVE: 1
RESPIRATORY NEGATIVE: 1
EYES NEGATIVE: 1
ALLERGIC/IMMUNOLOGIC NEGATIVE: 1

## 2025-03-17 ASSESSMENT — PATIENT HEALTH QUESTIONNAIRE - PHQ9
SUM OF ALL RESPONSES TO PHQ QUESTIONS 1-9: 0
1. LITTLE INTEREST OR PLEASURE IN DOING THINGS: NOT AT ALL
2. FEELING DOWN, DEPRESSED OR HOPELESS: NOT AT ALL
SUM OF ALL RESPONSES TO PHQ QUESTIONS 1-9: 0

## 2025-03-17 NOTE — PATIENT INSTRUCTIONS
As per our discussion,    As for your memory, we will not make any changes on your medication at this time.  Will continue donepezil 10 mg daily and memantine 10 mg twice daily.    I encouraged you to engage in approximately 2.5 hours of physician approved physical activity on a weekly basis.    To maintain a healthy diet. Also was informed of the Mediterranean diet, which has been associated with reduced risk for neurodegenerative conditions as well as heart disease.    To maintain a cognitively stimulated lifestyle, also incorporating social interaction.    As for the Parkinson's, I would encourage that you continue to remain active.  Adopt healthy lifestyles which include nutrition and exercise.  Please take your time when you are walking or when you are switching position to prevent falls.  Please use a cane when walking for long distance.    I would not make any changes in medication at this time will continue carbidopa levodopa 100/25 mg 3 times daily.    It was a pleasure to meet you and your wife today    Your next appointment is scheduled with Dr. Graham on 8/5/2025    Please do not hesitate to reach out for any questions or concerns

## 2025-03-17 NOTE — PROGRESS NOTES
Chief Complaint   Patient presents with    Other     Short term memory issues but no change from before.   Slight tremor is bilateral hands      Vitals:    03/17/25 1522   BP: 124/68   Pulse: 62   Resp: 18   Temp: 97.8 °F (36.6 °C)   SpO2: 98%         
purpose  5/5 x 4      Sensation: Intact to light touch    Coordination/Cerebellar: Finger-to-nose intact bilateral -mild tremor bilaterally    Gait: Ambulates independently.  Slow gait.  En bloc turning.  Alternating on the upper extremity - grossly intact  Foot tapping - grossly intact    Reflexes: Decreased throughout    Fall risk assessment  Failed to redirect to the Timeline version of the REVFS SmartLink.      Return for Keep prior visit w/other provider, With Dr. Graham.     This medical record was transcribed using an electronic medical records system.  Although proofread, it may and can contain electronic and spelling errors.  Other human spelling and other errors may be present.  Corrections may be executed at a later time.  Please feel free to contact us for any clarifications as needed.    The patient (or guardian, if applicable) and other individuals in attendance with the patient were advised that Artificial Intelligence will be utilized during this visit to record, process the conversation to generate a clinical note, and support improvement of the AI technology. The patient (or guardian, if applicable) and other individuals in attendance at the appointment consented to the use of AI, including the recording.         CORY Juarez NP

## 2025-03-19 LAB — HBA1C MFR BLD HPLC: 5.5 %

## 2025-03-19 NOTE — ASSESSMENT & PLAN NOTE
Supported by neuropsych evaluation from 12/4/24 with Dr. Anaya which showed major neurocognitive disorder/dementia, suggestive of frontal cortical dysfunction.    His current neurological examination revealed moderate cognitive impairment.  However, neuroexam was limited the patient became very agitated over the orientation questions and verbalized feeling stupid.  I recommended to keep the questions minimal at future visits.    I will not make any changes on his medication at this time.  Patient is to continue donepezil 10 mg daily and memantine 10 mg twice daily which he tolerated well.    He recently underwent a driving assessment, which yielded inconclusive results due to concerns about disorientation. He is advised to contact the V for a written and driving test. If he passes these tests, he will be cleared to drive.    We discussed treatment options in detail including risks/benefits and expectations.  While medication is not likely to made a drastic difference, it may aid modestly in improving concentration and attention.    - Medication titration and addition of adjunctive agents may be necessary to achieve maximum benefit.    - Explained that dementia is a progressive disease process.    - Discussed that he would benefit from designation of a POA to assist with executive decision making.  Finances and medication administration should be monitored to ensure accuracy and prevent errors.     Patient was encouraged to engage in approximately 2.5 hours of physician approved physical activity on a weekly basis.    Patient was encouraged to maintain a healthy diet. Also was informed of the Mediterranean diet, which has been associated with reduced risk for neurodegenerative conditions as well as heart disease.    Patient was encouraged to maintain a cognitively stimulated lifestyle, also incorporating social interaction.

## 2025-03-19 NOTE — ASSESSMENT & PLAN NOTE
Stable on current regimen.    Patient is to continue carbidopa levodopa 25/10 mg 3 times daily.    His gait abnormality could be related due to Parkinson's versus lumbar radiculopathy status post laminectomy years ago.    Parkinson's education was provided to patient and family today.    Patient was advised to continue to follow-up with his primary care provider and other specialist for other comorbid condition as appropriate.   He was encouraged to continue to remain active.  Adopt healthy lifestyles which include nutrition and exercise.    Fall safety was discussed to patient.

## 2025-05-29 RX ORDER — CARBIDOPA AND LEVODOPA 25; 100 MG/1; MG/1
TABLET ORAL
Qty: 270 TABLET | Refills: 0 | OUTPATIENT
Start: 2025-05-29

## 2025-05-29 NOTE — TELEPHONE ENCOUNTER
Last office visit: 03/17/2025  Next office visit: 08/05/2025  Last med refill: 12/17/2024 90 tablets with 11 refills

## 2025-08-05 ENCOUNTER — OFFICE VISIT (OUTPATIENT)
Age: 80
End: 2025-08-05
Payer: MEDICARE

## 2025-08-05 VITALS
HEART RATE: 51 BPM | BODY MASS INDEX: 33.29 KG/M2 | WEIGHT: 237.8 LBS | RESPIRATION RATE: 17 BRPM | TEMPERATURE: 97.9 F | SYSTOLIC BLOOD PRESSURE: 110 MMHG | OXYGEN SATURATION: 97 % | DIASTOLIC BLOOD PRESSURE: 64 MMHG | HEIGHT: 71 IN

## 2025-08-05 DIAGNOSIS — G20.A2 PARKINSON'S DISEASE WITHOUT DYSKINESIA, WITH FLUCTUATING MANIFESTATIONS (HCC): Primary | ICD-10-CM

## 2025-08-05 PROCEDURE — 1123F ACP DISCUSS/DSCN MKR DOCD: CPT | Performed by: PSYCHIATRY & NEUROLOGY

## 2025-08-05 PROCEDURE — G8427 DOCREV CUR MEDS BY ELIG CLIN: HCPCS | Performed by: PSYCHIATRY & NEUROLOGY

## 2025-08-05 PROCEDURE — 1160F RVW MEDS BY RX/DR IN RCRD: CPT | Performed by: PSYCHIATRY & NEUROLOGY

## 2025-08-05 PROCEDURE — 3078F DIAST BP <80 MM HG: CPT | Performed by: PSYCHIATRY & NEUROLOGY

## 2025-08-05 PROCEDURE — 1036F TOBACCO NON-USER: CPT | Performed by: PSYCHIATRY & NEUROLOGY

## 2025-08-05 PROCEDURE — 1159F MED LIST DOCD IN RCRD: CPT | Performed by: PSYCHIATRY & NEUROLOGY

## 2025-08-05 PROCEDURE — 1126F AMNT PAIN NOTED NONE PRSNT: CPT | Performed by: PSYCHIATRY & NEUROLOGY

## 2025-08-05 PROCEDURE — 99214 OFFICE O/P EST MOD 30 MIN: CPT | Performed by: PSYCHIATRY & NEUROLOGY

## 2025-08-05 PROCEDURE — G8417 CALC BMI ABV UP PARAM F/U: HCPCS | Performed by: PSYCHIATRY & NEUROLOGY

## 2025-08-05 PROCEDURE — 3074F SYST BP LT 130 MM HG: CPT | Performed by: PSYCHIATRY & NEUROLOGY

## 2025-08-05 RX ORDER — CARBIDOPA AND LEVODOPA 25; 100 MG/1; MG/1
1 TABLET ORAL 3 TIMES DAILY
Qty: 270 TABLET | Refills: 3 | Status: SHIPPED | OUTPATIENT
Start: 2025-08-05

## 2025-08-05 ASSESSMENT — PATIENT HEALTH QUESTIONNAIRE - PHQ9
SUM OF ALL RESPONSES TO PHQ QUESTIONS 1-9: 0
SUM OF ALL RESPONSES TO PHQ QUESTIONS 1-9: 0
1. LITTLE INTEREST OR PLEASURE IN DOING THINGS: NOT AT ALL
2. FEELING DOWN, DEPRESSED OR HOPELESS: NOT AT ALL
SUM OF ALL RESPONSES TO PHQ QUESTIONS 1-9: 0
SUM OF ALL RESPONSES TO PHQ QUESTIONS 1-9: 0